# Patient Record
Sex: MALE | Race: WHITE | NOT HISPANIC OR LATINO | Employment: FULL TIME | URBAN - NONMETROPOLITAN AREA
[De-identification: names, ages, dates, MRNs, and addresses within clinical notes are randomized per-mention and may not be internally consistent; named-entity substitution may affect disease eponyms.]

---

## 2022-10-06 ENCOUNTER — OFFICE VISIT (OUTPATIENT)
Dept: MEDICAL GROUP | Facility: PHYSICIAN GROUP | Age: 22
End: 2022-10-06
Payer: COMMERCIAL

## 2022-10-06 VITALS
WEIGHT: 315 LBS | SYSTOLIC BLOOD PRESSURE: 112 MMHG | RESPIRATION RATE: 18 BRPM | HEIGHT: 75 IN | DIASTOLIC BLOOD PRESSURE: 74 MMHG | TEMPERATURE: 98.2 F | HEART RATE: 68 BPM | OXYGEN SATURATION: 97 % | BODY MASS INDEX: 39.17 KG/M2

## 2022-10-06 DIAGNOSIS — Z82.49 FAMILY HISTORY OF HEART DISEASE: ICD-10-CM

## 2022-10-06 DIAGNOSIS — R06.83 LOUD SNORING: ICD-10-CM

## 2022-10-06 DIAGNOSIS — R00.2 PALPITATIONS: ICD-10-CM

## 2022-10-06 DIAGNOSIS — E66.01 MORBID OBESITY WITH BMI OF 40.0-44.9, ADULT (HCC): ICD-10-CM

## 2022-10-06 PROCEDURE — 99203 OFFICE O/P NEW LOW 30 MIN: CPT | Performed by: STUDENT IN AN ORGANIZED HEALTH CARE EDUCATION/TRAINING PROGRAM

## 2022-10-06 ASSESSMENT — ENCOUNTER SYMPTOMS
FEVER: 0
WHEEZING: 0
SHORTNESS OF BREATH: 0
ORTHOPNEA: 0
WEIGHT LOSS: 0
PALPITATIONS: 1
HEADACHES: 0
COUGH: 0
DIZZINESS: 0

## 2022-10-06 ASSESSMENT — PATIENT HEALTH QUESTIONNAIRE - PHQ9: CLINICAL INTERPRETATION OF PHQ2 SCORE: 0

## 2022-10-06 NOTE — ASSESSMENT & PLAN NOTE
Patient have intermittent palpitations with chest pain, went to the ER by that time it had resolved and ekg was wnl.     Thyroid testing  Referral to cardiology for cardiac monitoring may need holter monitor.

## 2022-10-06 NOTE — PROGRESS NOTES
Subjective:   HISTORY OF THE PRESENT ILLNESS: Patient is a 22 y.o. male here today to establish care.     Problem   Morbid Obesity With Bmi of 40.0-44.9, Adult (Hcc)   Palpitations    Intermittent palpitations, when he stays hydrated palpitations go away.     Does get dizziness occasionally as well chest pain that is not exertional. In fact feels like exertion makes the pain go away. Gets the chest pain 1 time a week associated with palpitations and dizziness.     No shortness of breath     Family History of Heart Disease    Mother had MI at age 24 and then again in 40s. Patient reports.           Current Outpatient Medications Ordered in Epic   Medication Sig Dispense Refill    ibuprofen (MOTRIN) 100 MG/5ML SUSP Take  by mouth every 6 hours as needed.       No current Epic-ordered facility-administered medications on file.       Review of systems:  Review of Systems   Constitutional:  Negative for fever, malaise/fatigue and weight loss.   Respiratory:  Negative for cough, shortness of breath and wheezing.    Cardiovascular:  Positive for chest pain and palpitations. Negative for orthopnea and leg swelling.   Neurological:  Negative for dizziness and headaches.       History reviewed. No pertinent past medical history.  Past Surgical History:   Procedure Laterality Date    ORIF, FRACTURE, RADIUS AND ULNA  2013    Performed by Michelet Mendoza D.O. at SURGERY [facility]     Social History     Tobacco Use    Smoking status: Former     Packs/day: 1.00     Years: 5.00     Pack years: 5.00     Types: Cigarettes     Quit date:      Years since quittin.7    Smokeless tobacco: Never   Vaping Use    Vaping Use: Every day    Substances: Nicotine (6 mg)    Devices: Ref[employer]ovalysble tank   Substance Use Topics    Alcohol use: Yes     Comment: rarely    Drug use: No      Family History   Problem Relation Age of Onset    Heart Disease Mother         MI at 41    Thyroid Mother     Thyroid Father      Current  "Outpatient Medications   Medication Sig Dispense Refill    ibuprofen (MOTRIN) 100 MG/5ML SUSP Take  by mouth every 6 hours as needed.       No current facility-administered medications for this visit.       Allergies:  No Known Allergies    Allergies, past medical history, past surgical history, family history, social history reviewed and updated.    Objective:    /74 (BP Location: Left arm, Patient Position: Sitting, BP Cuff Size: Adult long)   Pulse 68   Temp 36.8 °C (98.2 °F) (Temporal)   Resp 18   Ht 1.905 m (6' 3\")   Wt (!) 147 kg (324 lb 8.3 oz)   SpO2 97%   BMI 40.56 kg/m²    Body mass index is 40.56 kg/m².    Physical exam:  General: Normal appearance, no acute distress, not ill-appearing  HEENT: EOM intact, conjunctiva normal limits, negative right/left eye discharge.  Sclera anicteric  Cardiovascular: Normal rate and rhythm, no murmurs  Pulmonary: No respiratory distress, no wheezing, no rales, breath sounds normal.  Abdomen: Bowel sounds normal, flat, soft.  Musculoskeletal: No edema bilaterally  Skin: Warm, dry, no lesions  Neurological: No focal deficits, normal gait  Psychiatric: Mood within normal limits    Assessment/Plan:    Patient here for a preventive medicine visit today and to establish care.  -Reviewed all past medical history, family history, social history    -Diet and exercise appropriate counseling given  -Social determinants of health reviewed  -Tobacco, alcohol, recreational drug use: Discussed vaping use counseling given.  -Occupation:   -Cholesterol screening: Ordered  -Diabetes screening: Ordered    Immunizations/Infectious disease:  STI screening:declines  Safe sex practices discusssed  HIV screening: declines  Immunizations: declines flue    Cancer screenings:  Colorectal cancer screening: no fam hx of colon cancer      Problem List Items Addressed This Visit       Morbid obesity with BMI of 40.0-44.9, adult (HCC)     Reports have been trying and lost 40lbs in " last 3-4 months.          Relevant Orders    Patient identified as having weight management issue.  Appropriate orders and counseling given.    Lipid Profile    HEMOGLOBIN A1C    TSH    FREE THYROXINE    Comp Metabolic Panel    CBC WITHOUT DIFFERENTIAL    Palpitations     Patient have intermittent palpitations with chest pain, went to the ER by that time it had resolved and ekg was wnl.     Thyroid testing  Referral to cardiology for cardiac monitoring may need holter monitor.          Relevant Orders    REFERRAL TO CARDIOLOGY    Family history of heart disease     Other Visit Diagnoses       Loud snoring        Relevant Orders    Referral to Pulmonary and Sleep Medicine             Return in about 4 weeks (around 11/3/2022) for symptoms, labs.

## 2022-10-06 NOTE — LETTER
October 6, 2022    To Whom It May Concern:         This is confirmation that Anurag Workman attended his scheduled appointment with Charleen Gaxiola D.O. on 10/06/22.         If you have any questions please do not hesitate to call me at the phone number listed below.    Sincerely,          Charleen Gaxiola D.O.  [phone]

## 2022-11-10 ENCOUNTER — OFFICE VISIT (OUTPATIENT)
Dept: MEDICAL GROUP | Facility: PHYSICIAN GROUP | Age: 22
End: 2022-11-10
Payer: COMMERCIAL

## 2022-11-10 VITALS
OXYGEN SATURATION: 94 % | HEIGHT: 75 IN | RESPIRATION RATE: 18 BRPM | TEMPERATURE: 97.9 F | WEIGHT: 315 LBS | BODY MASS INDEX: 39.17 KG/M2 | DIASTOLIC BLOOD PRESSURE: 78 MMHG | SYSTOLIC BLOOD PRESSURE: 110 MMHG | HEART RATE: 72 BPM

## 2022-11-10 DIAGNOSIS — L30.0 NUMMULAR ECZEMATOUS DERMATITIS: ICD-10-CM

## 2022-11-10 DIAGNOSIS — R07.9 CHEST PAIN AT REST: ICD-10-CM

## 2022-11-10 DIAGNOSIS — R00.2 PALPITATIONS: ICD-10-CM

## 2022-11-10 DIAGNOSIS — R13.19 ESOPHAGEAL DYSPHAGIA: ICD-10-CM

## 2022-11-10 DIAGNOSIS — R22.1 FULLNESS OF NECK: ICD-10-CM

## 2022-11-10 DIAGNOSIS — Z82.49 FAMILY HISTORY OF HEART DISEASE: ICD-10-CM

## 2022-11-10 PROCEDURE — 99214 OFFICE O/P EST MOD 30 MIN: CPT | Performed by: STUDENT IN AN ORGANIZED HEALTH CARE EDUCATION/TRAINING PROGRAM

## 2022-11-10 NOTE — ASSESSMENT & PLAN NOTE
I suspect these are nummular eczema lesions.  He has tried clotrimazole for over a week with no improvement.  I will prescribe him a medium potency topical steroid to use for 14 days and advised him to use an emollient such as CeraVe a to keep moisturized.  We will see patient back in 1 month

## 2022-11-10 NOTE — PROGRESS NOTES
HISTORY OF PRESENT ILLNESS: Anurag is a pleasant 22 y.o. male, established patient who presents today to discuss medical problems as listed below:    Problem   Chest Pain At Rest   Nummular Eczematous Dermatitis    About 1 month ago patient started developing circular lesions on both of his arms.  He has never had this before.  He suspected it was ringworm though he denies any close contacts with other people.  He picked up clotrimazole cream and placed it on the lesions twice a day for over a week with no improvement.  They are scaly, dry and itchy.  He does have a history of eczema but not circular as these are.     Fullness of Neck    Patient reports that he feels a fullness in his neck that occasionally causes him some trouble swallowing.  Sensation occurs at random with no noticeable instigating factors though he does notice it more when he is tired after work.  Denies any nausea, vomiting, trouble breathing.  Denies any history of sleep apnea, reports that he does not snore     Esophageal Dysphagia        Current Outpatient Medications Ordered in Epic   Medication Sig Dispense Refill    betamethasone valerate (VALISONE) 0.1 % Cream Apply 1 Application topically 2 times a day for 14 days. 45 g 0    ibuprofen (MOTRIN) 100 MG/5ML SUSP Take  by mouth every 6 hours as needed.       No current Epic-ordered facility-administered medications on file.       Review of systems:  Per HPI    No past medical history on file.  Past Surgical History:   Procedure Laterality Date    ORIF, FRACTURE, RADIUS AND ULNA  2013    Performed by Michelet Mendoza D.O. at SURGERY [facility]     Social History     Tobacco Use    Smoking status: Former     Packs/day: 1.00     Years: 5.00     Pack years: 5.00     Types: Cigarettes     Quit date:      Years since quittin.8    Smokeless tobacco: Never   Vaping Use    Vaping Use: Every day    Substances: Nicotine (6 mg)    Devices: [employer]   Substance Use Topics     "Alcohol use: Yes     Comment: rarely    Drug use: No      Family History   Problem Relation Age of Onset    Heart Disease Mother         MI at 41    Thyroid Mother     Thyroid Father      Current Outpatient Medications   Medication Sig Dispense Refill    betamethasone valerate (VALISONE) 0.1 % Cream Apply 1 Application topically 2 times a day for 14 days. 45 g 0    ibuprofen (MOTRIN) 100 MG/5ML SUSP Take  by mouth every 6 hours as needed.       No current facility-administered medications for this visit.       Allergies:  No Known Allergies    Allergies, past medical history, past surgical history, family history, social history reviewed and updated.    Objective:    /78 (BP Location: Left arm, Patient Position: Sitting, BP Cuff Size: Adult long)   Pulse 72   Temp 36.6 °C (97.9 °F) (Temporal)   Resp 18   Ht 1.905 m (6' 3\")   Wt (!) 143 kg (315 lb 14.7 oz)   SpO2 94%   BMI 39.49 kg/m²    Body mass index is 39.49 kg/m².    Physical exam:  General: Normal appearance, no acute distress, not ill-appearing  HEENT: EOM intact, conjunctiva normal limits, negative right/left eye discharge.  Sclera anicteric  Cardiovascular: Normal rate and rhythm, no murmurs  Pulmonary: No respiratory distress, no wheezing, no rales, breath sounds normal.  Abdomen: Bowel sounds normal, flat, soft.  Musculoskeletal: No edema bilaterally  Skin: Warm, dry, no lesions  Neurological: No focal deficits, normal gait  Psychiatric: Mood within normal limits    Assessment/Plan:    Problem List Items Addressed This Visit       Palpitations    Relevant Orders    REFERRAL TO CARDIOLOGY    Family history of heart disease    Relevant Orders    REFERRAL TO CARDIOLOGY    Chest pain at rest    Relevant Orders    REFERRAL TO CARDIOLOGY    Nummular eczematous dermatitis     I suspect these are nummular eczema lesions.  He has tried clotrimazole for over a week with no improvement.  I will prescribe him a medium potency topical steroid to use for 14 " days and advised him to use an emollient such as CeraVe a to keep moisturized.  We will see patient back in 1 month         Relevant Medications    betamethasone valerate (VALISONE) 0.1 % Cream    Fullness of neck     Ultrasound of the neck  Consider sleep study suspicion for sleep apnea is high due to body habitus.         Relevant Orders    US-SOFT TISSUES OF HEAD - NECK    Esophageal dysphagia    Relevant Orders    US-SOFT TISSUES OF HEAD - NECK        Return in about 4 weeks (around 12/8/2022) for labs.

## 2022-11-10 NOTE — ASSESSMENT & PLAN NOTE
Ultrasound of the neck  Consider sleep study suspicion for sleep apnea is high due to body habitus.

## 2022-12-09 ENCOUNTER — OFFICE VISIT (OUTPATIENT)
Dept: MEDICAL GROUP | Facility: PHYSICIAN GROUP | Age: 22
End: 2022-12-09
Payer: COMMERCIAL

## 2022-12-09 VITALS
WEIGHT: 313.94 LBS | TEMPERATURE: 97.4 F | BODY MASS INDEX: 39.03 KG/M2 | HEIGHT: 75 IN | OXYGEN SATURATION: 98 % | DIASTOLIC BLOOD PRESSURE: 80 MMHG | RESPIRATION RATE: 16 BRPM | SYSTOLIC BLOOD PRESSURE: 118 MMHG | HEART RATE: 74 BPM

## 2022-12-09 DIAGNOSIS — E66.01 MORBID OBESITY WITH BMI OF 40.0-44.9, ADULT (HCC): ICD-10-CM

## 2022-12-09 DIAGNOSIS — R07.9 CHEST PAIN AT REST: ICD-10-CM

## 2022-12-09 DIAGNOSIS — L30.0 NUMMULAR ECZEMATOUS DERMATITIS: ICD-10-CM

## 2022-12-09 PROCEDURE — 99213 OFFICE O/P EST LOW 20 MIN: CPT | Performed by: STUDENT IN AN ORGANIZED HEALTH CARE EDUCATION/TRAINING PROGRAM

## 2022-12-09 RX ORDER — TRIAMCINOLONE ACETONIDE 1 MG/G
1 OINTMENT TOPICAL 2 TIMES DAILY
Qty: 80 G | Refills: 0 | Status: SHIPPED | OUTPATIENT
Start: 2022-12-09 | End: 2023-01-08

## 2022-12-09 NOTE — ASSESSMENT & PLAN NOTE
Rx high potency triamcinolone ointment to use twice daily for 30 days.  Return to care in 1 month for reevaluation

## 2022-12-09 NOTE — PROGRESS NOTES
HISTORY OF PRESENT ILLNESS: Anurag is a pleasant 22 y.o. male, established patient who presents today to discuss medical problems as listed below:    Problem   Chest Pain At Rest   Nummular Eczematous Dermatitis    About 1 month ago patient started developing circular lesions on both of his arms.  He has never had this before.  He suspected it was ringworm though he denies any close contacts with other people.  He picked up clotrimazole cream and placed it on the lesions twice a day for over a week with no improvement.  At the last visit he was prescribed a steroid ointment but had not picked it up.  The rash has since grown worse on his arms and is spreading.          Current Outpatient Medications Ordered in Epic   Medication Sig Dispense Refill    triamcinolone acetonide (KENALOG) 0.1 % Ointment Apply 1 Application topically 2 times a day for 30 days. 80 g 0    ibuprofen (MOTRIN) 100 MG/5ML SUSP Take  by mouth every 6 hours as needed.       No current Epic-ordered facility-administered medications on file.       Review of systems:  Per HPI    No past medical history on file.  Past Surgical History:   Procedure Laterality Date    ORIF, FRACTURE, RADIUS AND ULNA  2013    Performed by Michelet Mendoza D.O. at SURGERY [facility]     Social History     Tobacco Use    Smoking status: Former     Packs/day: 1.00     Years: 5.00     Pack years: 5.00     Types: Cigarettes     Quit date:      Years since quittin.9    Smokeless tobacco: Never   Vaping Use    Vaping Use: Every day    Substances: Nicotine (6 mg)    Devices: Ref[employer]ograph[employer]ble tank   Substance Use Topics    Alcohol use: Yes     Comment: rarely    Drug use: No      Family History   Problem Relation Age of Onset    Heart Disease Mother         MI at 41    Thyroid Mother     Thyroid Father      Current Outpatient Medications   Medication Sig Dispense Refill    triamcinolone acetonide (KENALOG) 0.1 % Ointment Apply 1 Application topically 2 times a  "day for 30 days. 80 g 0    ibuprofen (MOTRIN) 100 MG/5ML SUSP Take  by mouth every 6 hours as needed.       No current facility-administered medications for this visit.       Allergies:  No Known Allergies    Allergies, past medical history, past surgical history, family history, social history reviewed and updated.    Objective:    /80 (BP Location: Left arm, Patient Position: Sitting, BP Cuff Size: Adult long)   Pulse 74   Temp 36.3 °C (97.4 °F) (Temporal)   Resp 16   Ht 1.905 m (6' 3\")   Wt (!) 142 kg (313 lb 15 oz)   SpO2 98%   BMI 39.24 kg/m²    Body mass index is 39.24 kg/m².    Physical exam:  General: Normal appearance, no acute distress, not ill-appearing  HEENT: EOM intact, conjunctiva normal limits, negative right/left eye discharge.  Sclera anicteric  Cardiovascular: Normal rate and rhythm, no murmurs  Pulmonary: No respiratory distress, no wheezing, no rales, breath sounds normal.  Abdomen: Bowel sounds normal, flat, soft.  Musculoskeletal: No edema bilaterally  Skin: Warm, dry, no lesions  Neurological: No focal deficits, normal gait  Psychiatric: Mood within normal limits    Assessment/Plan:    Problem List Items Addressed This Visit       Morbid obesity with BMI of 40.0-44.9, adult (HCC)    Relevant Orders    Lipid Profile    HEMOGLOBIN A1C    Chest pain at rest     The ASCVD Risk score ([place] DK, et al., 2019) failed to calculate for the following reasons:    The 2019 ASCVD risk score is only valid for ages 40 to 79    Patient continues to have chest pain at rest, palpitations occasionally as well as dyspnea on exertion.  He was referred to cardiology at the last visit but never received a call back.  We will reprint referral for him.         Nummular eczematous dermatitis     Rx high potency triamcinolone ointment to use twice daily for 30 days.  Return to care in 1 month for reevaluation         Relevant Medications    triamcinolone acetonide (KENALOG) 0.1 % Ointment        Return in " about 4 weeks (around 1/6/2023) for symptoms.

## 2022-12-09 NOTE — ASSESSMENT & PLAN NOTE
The ASCVD Risk score (Joo COSTELLO, et al., 2019) failed to calculate for the following reasons:    The 2019 ASCVD risk score is only valid for ages 40 to 79    Patient continues to have chest pain at rest, palpitations occasionally as well as dyspnea on exertion.  He was referred to cardiology at the last visit but never received a call back.  We will reprint referral for him.

## 2022-12-15 LAB — HBA1C MFR BLD: 5.5 % (ref 0–5.6)

## 2022-12-19 ENCOUNTER — TELEPHONE (OUTPATIENT)
Dept: MEDICAL GROUP | Facility: PHYSICIAN GROUP | Age: 22
End: 2022-12-19
Payer: COMMERCIAL

## 2022-12-19 NOTE — TELEPHONE ENCOUNTER
----- Message from Charleen Gaxiola D.O. sent at 12/19/2022  7:22 AM PST -----  Please let patient know his labs look good     Charleen Gaxiola D.O.

## 2022-12-19 NOTE — TELEPHONE ENCOUNTER
Spoke to patient in regards to his lab results. Informed him that they were normal. No other questions at this time.

## 2023-01-13 ENCOUNTER — OFFICE VISIT (OUTPATIENT)
Dept: MEDICAL GROUP | Facility: PHYSICIAN GROUP | Age: 23
End: 2023-01-13
Payer: COMMERCIAL

## 2023-01-13 VITALS
SYSTOLIC BLOOD PRESSURE: 110 MMHG | HEART RATE: 78 BPM | RESPIRATION RATE: 16 BRPM | WEIGHT: 305 LBS | OXYGEN SATURATION: 100 % | DIASTOLIC BLOOD PRESSURE: 72 MMHG | HEIGHT: 74 IN | TEMPERATURE: 98.1 F | BODY MASS INDEX: 39.14 KG/M2

## 2023-01-13 DIAGNOSIS — R07.9 CHEST PAIN AT REST: ICD-10-CM

## 2023-01-13 DIAGNOSIS — M54.31 RIGHT SIDED SCIATICA: ICD-10-CM

## 2023-01-13 DIAGNOSIS — K21.9 GASTROESOPHAGEAL REFLUX DISEASE, UNSPECIFIED WHETHER ESOPHAGITIS PRESENT: ICD-10-CM

## 2023-01-13 PROCEDURE — 93000 ELECTROCARDIOGRAM COMPLETE: CPT | Performed by: STUDENT IN AN ORGANIZED HEALTH CARE EDUCATION/TRAINING PROGRAM

## 2023-01-13 PROCEDURE — 99214 OFFICE O/P EST MOD 30 MIN: CPT | Performed by: STUDENT IN AN ORGANIZED HEALTH CARE EDUCATION/TRAINING PROGRAM

## 2023-01-13 RX ORDER — FAMOTIDINE 20 MG/1
40 TABLET, FILM COATED ORAL 2 TIMES DAILY
Qty: 60 TABLET | Refills: 1 | Status: SHIPPED | OUTPATIENT
Start: 2023-01-13 | End: 2023-04-14

## 2023-01-13 RX ORDER — CYCLOBENZAPRINE HCL 10 MG
10 TABLET ORAL 2 TIMES DAILY PRN
Qty: 60 TABLET | Refills: 1 | Status: SHIPPED | OUTPATIENT
Start: 2023-01-13

## 2023-01-13 RX ORDER — OMEPRAZOLE 10 MG/1
30 CAPSULE, DELAYED RELEASE ORAL DAILY
COMMUNITY
End: 2023-04-14

## 2023-01-13 ASSESSMENT — FIBROSIS 4 INDEX: FIB4 SCORE: 0.4

## 2023-01-13 ASSESSMENT — PATIENT HEALTH QUESTIONNAIRE - PHQ9: CLINICAL INTERPRETATION OF PHQ2 SCORE: 0

## 2023-01-13 NOTE — PROGRESS NOTES
HISTORY OF PRESENT ILLNESS: Anurag is a pleasant 22 y.o. male, established patient who presents today to discuss medical problems as listed below:    Problem   Chest Pain At Rest    Patient still reports to chest pain that occurs at rest.  Denies any exertional component to this.  He went to the ER a few weeks ago and was told that he had a hiatal hernia that may be the cause of the symptoms.  He was given a PPI which does help the symptoms somewhat but they still persist.  Troponin negative.          Current Outpatient Medications Ordered in Epic   Medication Sig Dispense Refill    omeprazole (PRILOSEC) 10 MG CAPSULE DELAYED RELEASE Take 30 mg by mouth every day.      famotidine (PEPCID) 20 MG Tab Take 2 Tablets by mouth 2 times a day. 60 Tablet 1    cyclobenzaprine (FLEXERIL) 10 mg Tab Take 1 Tablet by mouth 2 times a day as needed for Moderate Pain. 60 Tablet 1    ibuprofen (MOTRIN) 100 MG/5ML SUSP Take  by mouth every 6 hours as needed. (Patient not taking: Reported on 2023)       No current [facility]-ordered facility-administered medications on file.       Review of systems:  Per HPI    No past medical history on file.  Past Surgical History:   Procedure Laterality Date    ORIF, FRACTURE, RADIUS AND ULNA  2013    Performed by Michelet Mendoza D.O. at SURGERY [facility]     Social History     Tobacco Use    Smoking status: Former     Packs/day: 1.00     Years: 5.00     Pack years: 5.00     Types: Cigarettes     Quit date:      Years since quittin.0    Smokeless tobacco: Never   Vaping Use    Vaping Use: Every day    Substances: Nicotine (6 mg)    Devices: [employer] tank   Substance Use Topics    Alcohol use: Yes     Comment: rarely    Drug use: No      Family History   Problem Relation Age of Onset    Heart Disease Mother         MI at 41    Thyroid Mother     Thyroid Father      Current Outpatient Medications   Medication Sig Dispense Refill    omeprazole (PRILOSEC) 10 MG CAPSULE DELAYED  "RELEASE Take 30 mg by mouth every day.      famotidine (PEPCID) 20 MG Tab Take 2 Tablets by mouth 2 times a day. 60 Tablet 1    cyclobenzaprine (FLEXERIL) 10 mg Tab Take 1 Tablet by mouth 2 times a day as needed for Moderate Pain. 60 Tablet 1    ibuprofen (MOTRIN) 100 MG/5ML SUSP Take  by mouth every 6 hours as needed. (Patient not taking: Reported on 1/13/2023)       No current facility-administered medications for this visit.       Allergies:  No Known Allergies    Allergies, past medical history, past surgical history, family history, social history reviewed and updated.    Objective:    /72 (BP Location: Left arm, Patient Position: Sitting, BP Cuff Size: Large adult)   Pulse 78   Temp 36.7 °C (98.1 °F) (Temporal)   Resp 16   Ht 1.88 m (6' 2\")   Wt (!) 138 kg (305 lb)   SpO2 100%   BMI 39.16 kg/m²    Body mass index is 39.16 kg/m².    Physical exam:  General: Normal appearance, no acute distress, not ill-appearing  HEENT: EOM intact, conjunctiva normal limits, negative right/left eye discharge.  Sclera anicteric  Cardiovascular: Normal rate and rhythm, no murmurs  Pulmonary: No respiratory distress, no wheezing, no rales, breath sounds normal.  Abdomen: Bowel sounds normal, flat, soft.  Musculoskeletal: No edema bilaterally  Skin: Warm, dry, no lesions  Neurological: No focal deficits, normal gait  Psychiatric: Mood within normal limits    Assessment/Plan:    Problem List Items Addressed This Visit       Chest pain at rest     We will work patient up for possible reflux, esophagitis.  Do not believe this is cardiac etiology but will keep on differential.  Chest pain is nonexertional    EKG: Regular sinus rate and rhythm.  Normal axis, none ST segment changes, no T wave inversions, intervals within normal limits    Discontinue PPI, start Pepcid.  After 1 week get the H. pylori breath test.  We will follow-up after results.  Consider referring to GI for hiatal hernia management.    This chronic issue " not well controlled medication management and test ordered  ER documentation review including CT scan of chest, labs..,  Chest x-ray.           Relevant Orders    EKG - Clinic Performed (Completed)     Other Visit Diagnoses       Gastroesophageal reflux disease, unspecified whether esophagitis present        Relevant Medications    omeprazole (PRILOSEC) 10 MG CAPSULE DELAYED RELEASE    famotidine (PEPCID) 20 MG Tab    Other Relevant Orders    H. PYLORI, UREA BREATH TEST, ADULT    Right sided sciatica        Relevant Medications    cyclobenzaprine (FLEXERIL) 10 mg Tab             Return in about 3 months (around 4/13/2023) for symptoms.

## 2023-01-14 NOTE — ASSESSMENT & PLAN NOTE
We will work patient up for possible reflux, esophagitis.  Do not believe this is cardiac etiology but will keep on differential.  Chest pain is nonexertional    EKG: Regular sinus rate and rhythm.  Normal axis, none ST segment changes, no T wave inversions, intervals within normal limits    Discontinue PPI, start Pepcid.  After 1 week get the H. pylori breath test.  We will follow-up after results.  Consider referring to GI for hiatal hernia management.    This chronic issue not well controlled medication management and test ordered  ER documentation review including CT scan of chest, labs..,  Chest x-ray.

## 2023-04-12 RX ORDER — LANSOPRAZOLE 30 MG/1
30 CAPSULE, DELAYED RELEASE ORAL
COMMUNITY
Start: 2022-12-17 | End: 2023-04-14

## 2023-04-14 ENCOUNTER — OFFICE VISIT (OUTPATIENT)
Dept: MEDICAL GROUP | Facility: PHYSICIAN GROUP | Age: 23
End: 2023-04-14
Payer: COMMERCIAL

## 2023-04-14 VITALS
OXYGEN SATURATION: 95 % | TEMPERATURE: 98.9 F | HEART RATE: 85 BPM | DIASTOLIC BLOOD PRESSURE: 76 MMHG | SYSTOLIC BLOOD PRESSURE: 112 MMHG | BODY MASS INDEX: 39.47 KG/M2 | HEIGHT: 74 IN | WEIGHT: 307.54 LBS | RESPIRATION RATE: 16 BRPM

## 2023-04-14 DIAGNOSIS — L30.0 NUMMULAR ECZEMATOUS DERMATITIS: ICD-10-CM

## 2023-04-14 DIAGNOSIS — R06.09 DOE (DYSPNEA ON EXERTION): ICD-10-CM

## 2023-04-14 DIAGNOSIS — R06.83 LOUD SNORING: ICD-10-CM

## 2023-04-14 PROBLEM — R00.2 PALPITATIONS: Status: RESOLVED | Noted: 2022-10-06 | Resolved: 2023-04-14

## 2023-04-14 PROBLEM — R22.1 FULLNESS OF NECK: Status: RESOLVED | Noted: 2022-11-10 | Resolved: 2023-04-14

## 2023-04-14 PROBLEM — E66.9 OBESITY (BMI 30-39.9): Status: ACTIVE | Noted: 2022-10-06

## 2023-04-14 PROBLEM — R13.19 ESOPHAGEAL DYSPHAGIA: Status: RESOLVED | Noted: 2022-11-10 | Resolved: 2023-04-14

## 2023-04-14 PROCEDURE — 99214 OFFICE O/P EST MOD 30 MIN: CPT | Performed by: STUDENT IN AN ORGANIZED HEALTH CARE EDUCATION/TRAINING PROGRAM

## 2023-04-14 ASSESSMENT — ENCOUNTER SYMPTOMS
DIZZINESS: 0
HEADACHES: 0
COUGH: 0
SHORTNESS OF BREATH: 1
WHEEZING: 0
PALPITATIONS: 0

## 2023-04-14 ASSESSMENT — FIBROSIS 4 INDEX: FIB4 SCORE: 0.42

## 2023-04-14 NOTE — ASSESSMENT & PLAN NOTE
Kenalog ointment for 3 weeks  Then start hydrocortisone 2.5% as needed for maintenance    Advised daily CeraVe ointment after showering to keep skin moisturized and hydrated    Chronic condition, not well controlled medication management

## 2023-04-14 NOTE — ASSESSMENT & PLAN NOTE
Dyspnea on exertion which occurs in the morning  Likely a factor of sleep apnea, vaping and deconditioning.  No wheezing on exam today    Plan:  Continue weight loss, continue vaping cessation which she has started 2 weeks ago.  Sleep study for sleep apnea.  If worsening return to care, consider pulmonary function test at next visit if not improving

## 2023-04-14 NOTE — PROGRESS NOTES
HISTORY OF PRESENT ILLNESS: Anurag is a pleasant 23 y.o. male, established patient who presents today to discuss medical problems as listed below:    #Nummular eczema  Patient reports that he uses a high potency Kenalog ointment and it works well however after he discontinues this the rash comes back.  He has not been using any CeraVe emollient or moisturizer.  And also does not have a hydrocortisone low potency ointment to use as needed.    #Obesity  Improving BMI now 39 from 40.  He is continuing to make his own meals at home, exercising and watching his portions.       Current Outpatient Medications Ordered in Epic   Medication Sig Dispense Refill    hydrocortisone 2.5 % Cream topical cream Apply 1 Application. topically 2 times a day. 453 g 1    cyclobenzaprine (FLEXERIL) 10 mg Tab Take 1 Tablet by mouth 2 times a day as needed for Moderate Pain. 60 Tablet 1     No current Epic-ordered facility-administered medications on file.       Review of systems:  Review of Systems   Respiratory:  Positive for shortness of breath. Negative for cough and wheezing.    Cardiovascular:  Negative for chest pain and palpitations.   Neurological:  Negative for dizziness and headaches.       History reviewed. No pertinent past medical history.  Past Surgical History:   Procedure Laterality Date    ORIF, FRACTURE, RADIUS AND ULNA  2013    Performed by Michelet Mendoza D.O. at SURGERY [facility]     Social History     Tobacco Use    Smoking status: Former     Packs/day: 1.00     Years: 5.00     Pack years: 5.00     Types: Cigarettes     Quit date:      Years since quittin.2    Smokeless tobacco: Never   Vaping Use    Vaping Use: Every day    Substances: Nicotine (6 mg)    Devices: [employer]   Substance Use Topics    Alcohol use: Yes     Comment: rarely    Drug use: No      Family History   Problem Relation Age of Onset    Heart Disease Mother         MI at 41    Thyroid Mother     Thyroid Father   "    Current Outpatient Medications   Medication Sig Dispense Refill    hydrocortisone 2.5 % Cream topical cream Apply 1 Application. topically 2 times a day. 453 g 1    cyclobenzaprine (FLEXERIL) 10 mg Tab Take 1 Tablet by mouth 2 times a day as needed for Moderate Pain. 60 Tablet 1     No current facility-administered medications for this visit.       Allergies:  No Known Allergies    Allergies, past medical history, past surgical history, family history, social history reviewed and updated.    Objective:    /76 (BP Location: Left arm, Patient Position: Sitting, BP Cuff Size: Adult long)   Pulse 85   Temp 37.2 °C (98.9 °F) (Temporal)   Resp 16   Ht 1.88 m (6' 2\")   Wt (!) 140 kg (307 lb 8.7 oz)   SpO2 95%   BMI 39.49 kg/m²    Body mass index is 39.49 kg/m².    Physical exam:  General: Normal appearance, no acute distress, not ill-appearing  HEENT: EOM intact, conjunctiva normal limits, negative right/left eye discharge.  Sclera anicteric  Cardiovascular: Normal rate and rhythm, no murmurs  Pulmonary: No respiratory distress, no wheezing, no rales, breath sounds normal.  Abdomen: Bowel sounds normal, flat, soft.  Musculoskeletal: No edema bilaterally  Skin: Warm, dry, no lesions  Neurological: No focal deficits, normal gait  Psychiatric: Mood within normal limits    Assessment/Plan:    Problem List Items Addressed This Visit       Nummular eczematous dermatitis     Kenalog ointment for 3 weeks  Then start hydrocortisone 2.5% as needed for maintenance    Advised daily CeraVe ointment after showering to keep skin moisturized and hydrated    Chronic condition, not well controlled medication management         Relevant Medications    hydrocortisone 2.5 % Cream topical cream    HURD (dyspnea on exertion)     Dyspnea on exertion which occurs in the morning  Likely a factor of sleep apnea, vaping and deconditioning.  No wheezing on exam today    Plan:  Continue weight loss, continue vaping cessation which she " has started 2 weeks ago.  Sleep study for sleep apnea.  If worsening return to care, consider pulmonary function test at next visit if not improving          Other Visit Diagnoses       Loud snoring        Relevant Orders    Referral to Pulmonary and Sleep Medicine             Return in about 6 months (around 10/14/2023), or if symptoms worsen or fail to improve.

## 2023-10-13 ENCOUNTER — OFFICE VISIT (OUTPATIENT)
Dept: MEDICAL GROUP | Facility: PHYSICIAN GROUP | Age: 23
End: 2023-10-13
Payer: COMMERCIAL

## 2023-10-13 VITALS
SYSTOLIC BLOOD PRESSURE: 122 MMHG | TEMPERATURE: 97.7 F | RESPIRATION RATE: 16 BRPM | BODY MASS INDEX: 39.17 KG/M2 | WEIGHT: 315 LBS | OXYGEN SATURATION: 94 % | HEIGHT: 75 IN | HEART RATE: 83 BPM | DIASTOLIC BLOOD PRESSURE: 78 MMHG

## 2023-10-13 DIAGNOSIS — E66.01 CLASS 3 SEVERE OBESITY DUE TO EXCESS CALORIES WITH SERIOUS COMORBIDITY AND BODY MASS INDEX (BMI) OF 40.0 TO 44.9 IN ADULT (HCC): ICD-10-CM

## 2023-10-13 DIAGNOSIS — L30.0 NUMMULAR ECZEMATOUS DERMATITIS: ICD-10-CM

## 2023-10-13 DIAGNOSIS — E66.01 MORBID OBESITY WITH BMI OF 40.0-44.9, ADULT (HCC): ICD-10-CM

## 2023-10-13 DIAGNOSIS — J30.1 SEASONAL ALLERGIC RHINITIS DUE TO POLLEN: ICD-10-CM

## 2023-10-13 DIAGNOSIS — H61.23 BILATERAL IMPACTED CERUMEN: ICD-10-CM

## 2023-10-13 PROBLEM — R06.02 SHORTNESS OF BREATH: Status: ACTIVE | Noted: 2023-04-14

## 2023-10-13 PROBLEM — E66.09 OBESITY DUE TO EXCESS CALORIES WITH SERIOUS COMORBIDITY: Status: ACTIVE | Noted: 2023-10-13

## 2023-10-13 PROBLEM — R07.9 CHEST PAIN AT REST: Status: RESOLVED | Noted: 2022-11-10 | Resolved: 2023-10-13

## 2023-10-13 PROCEDURE — 99213 OFFICE O/P EST LOW 20 MIN: CPT | Performed by: STUDENT IN AN ORGANIZED HEALTH CARE EDUCATION/TRAINING PROGRAM

## 2023-10-13 PROCEDURE — 3078F DIAST BP <80 MM HG: CPT | Performed by: STUDENT IN AN ORGANIZED HEALTH CARE EDUCATION/TRAINING PROGRAM

## 2023-10-13 PROCEDURE — 3074F SYST BP LT 130 MM HG: CPT | Performed by: STUDENT IN AN ORGANIZED HEALTH CARE EDUCATION/TRAINING PROGRAM

## 2023-10-13 RX ORDER — CETIRIZINE HYDROCHLORIDE 10 MG/1
10 TABLET ORAL DAILY
Qty: 90 TABLET | Refills: 1 | Status: SHIPPED | OUTPATIENT
Start: 2023-10-13

## 2023-10-13 RX ORDER — FLUTICASONE PROPIONATE 50 MCG
1 SPRAY, SUSPENSION (ML) NASAL DAILY
Qty: 16 G | Refills: 1 | Status: SHIPPED | OUTPATIENT
Start: 2023-10-13

## 2023-10-13 RX ORDER — ACETAMINOPHEN 500 MG
TABLET ORAL
COMMUNITY

## 2023-10-13 ASSESSMENT — FIBROSIS 4 INDEX: FIB4 SCORE: 0.42

## 2023-10-13 NOTE — ASSESSMENT & PLAN NOTE
Bilateral cerumen impaction.  Patient cannot tolerate irrigation due to it causing dizziness.  Advised to do Debrox daily return to care in a few weeks to try again.  Advised to not use Q-tips.

## 2023-10-13 NOTE — ASSESSMENT & PLAN NOTE
BMI increasing though may be due to muscle mass.  Continue resistance exercise, cardiovascular exercise.  Diet counseling given.

## 2023-10-13 NOTE — PROGRESS NOTES
HISTORY OF PRESENT ILLNESS: Anurag is a pleasant 23 y.o. male, established patient who presents today to discuss medical problems as listed below:    Problem   Bilateral Impacted Cerumen   Obesity Due to Excess Calories With Serious Comorbidity   Morbid Obesity With Bmi of 40.0-44.9, Adult ([facility])    Reports he has gained weight since last time however he is exercising a lot more and weight lifting which he thinks may be contributing as he has gained some muscle.  Reports that he feels better, he is less short of breath and has more endurance.  He is starting to eat at home and bring his meals to work     Seasonal Allergic Rhinitis Due to Pollen   Shortness of Breath   Nummular Eczematous Dermatitis    Reports well-controlled with hydrocortisone 2.5% cream     Chest Pain At Rest (Resolved)    Patient still reports to chest pain that occurs at rest.  Denies any exertional component to this.  He went to the ER a few weeks ago and was told that he had a hiatal hernia that may be the cause of the symptoms.  He was given a PPI which does help the symptoms somewhat but they still persist.  Troponin negative.          Current Outpatient Medications Ordered in Epic   Medication Sig Dispense Refill    acetaminophen (TYLENOL) 500 MG Tab Take  by mouth.      cetirizine (ZYRTEC) 10 MG Tab Take 1 Tablet by mouth every day. 90 Tablet 1    fluticasone (FLONASE) 50 MCG/ACT nasal spray Administer 1 Spray into affected nostril(S) every day. 16 g 1    hydrocortisone 2.5 % Cream topical cream Apply 1 Application. topically 2 times a day. 453 g 1    cyclobenzaprine (FLEXERIL) 10 mg Tab Take 1 Tablet by mouth 2 times a day as needed for Moderate Pain. 60 Tablet 1     No current Epic-ordered facility-administered medications on file.       Review of systems:  Per HPI    Patient Active Problem List    Diagnosis Date Noted    Bilateral impacted cerumen 10/13/2023    Obesity due to excess calories with serious comorbidity 10/13/2023    Morbid  "obesity with BMI of 40.0-44.9, adult ([facility]) 10/13/2023    Seasonal allergic rhinitis due to pollen 10/13/2023    Shortness of breath 2023    Nummular eczematous dermatitis 11/10/2022    Family history of heart disease 10/06/2022     Past Surgical History:   Procedure Laterality Date    ORIF, FRACTURE, RADIUS AND ULNA  2013    Performed by Michelet Mendoza D.O. at SURGERY [facility]     Social History     Tobacco Use    Smoking status: Former     Current packs/day: 0.00     Average packs/day: 1 pack/day for 5.0 years (5.0 ttl pk-yrs)     Types: Cigarettes     Start date:      Quit date:      Years since quittin.7    Smokeless tobacco: Never   Vaping Use    Vaping Use: Every day    Substances: Nicotine (6 mg)    Devices: [employer]   Substance Use Topics    Alcohol use: Yes     Comment: rarely    Drug use: No      Family History   Problem Relation Age of Onset    Heart Disease Mother         MI at 41    Thyroid Mother     Thyroid Father      Current Outpatient Medications   Medication Sig Dispense Refill    acetaminophen (TYLENOL) 500 MG Tab Take  by mouth.      cetirizine (ZYRTEC) 10 MG Tab Take 1 Tablet by mouth every day. 90 Tablet 1    fluticasone (FLONASE) 50 MCG/ACT nasal spray Administer 1 Spray into affected nostril(S) every day. 16 g 1    hydrocortisone 2.5 % Cream topical cream Apply 1 Application. topically 2 times a day. 453 g 1    cyclobenzaprine (FLEXERIL) 10 mg Tab Take 1 Tablet by mouth 2 times a day as needed for Moderate Pain. 60 Tablet 1     No current facility-administered medications for this visit.       Allergies:  No Known Allergies    Allergies, past medical history, past surgical history, family history, social history reviewed and updated.    Objective:    /78 (BP Location: Left arm, Patient Position: Sitting, BP Cuff Size: Large adult)   Pulse 83   Temp 36.5 °C (97.7 °F) (Temporal)   Resp 16   Ht 1.905 m (6' 3\")   Wt (!) 152 kg (335 lb 5.1 oz)  "  SpO2 94%   BMI 41.91 kg/m²    Body mass index is 41.91 kg/m².    Physical exam:  General: Normal appearance, no acute distress, not ill-appearing  HEENT: EOM intact, conjunctiva normal limits, negative right/left eye discharge.  Sclera anicteric, Derman impaction bilaterally  Cardiovascular: Normal rate and rhythm, no murmurs  Pulmonary: No respiratory distress, no wheezing, no rales, breath sounds normal.  Musculoskeletal: No edema bilaterally  Skin: Warm, dry, no lesions  Neurological: No focal deficits, normal gait  Psychiatric: Mood within normal limits    Assessment/Plan:    Problem List Items Addressed This Visit       Nummular eczematous dermatitis     Chronic, stable.  Continue hydrocortisone as needed         Bilateral impacted cerumen     Bilateral cerumen impaction.  Patient cannot tolerate irrigation due to it causing dizziness.  Advised to do Debrox daily return to care in a few weeks to try again.  Advised to not use Q-tips.         Obesity due to excess calories with serious comorbidity    Morbid obesity with BMI of 40.0-44.9, adult ([facility])     BMI increasing though may be due to muscle mass.  Continue resistance exercise, cardiovascular exercise.  Diet counseling given.         Seasonal allergic rhinitis due to pollen     Seasonal allergies.  He is using Mucinex with no benefit.  Advised to DC this, start Flonase, Zyrtec.         Relevant Medications    cetirizine (ZYRTEC) 10 MG Tab    fluticasone (FLONASE) 50 MCG/ACT nasal spray        Return in about 4 weeks (around 11/10/2023), or if symptoms worsen or fail to improve.

## 2023-10-13 NOTE — ASSESSMENT & PLAN NOTE
Seasonal allergies.  He is using Mucinex with no benefit.  Advised to DC this, start Flonase, Zyrtec.

## 2025-01-17 ENCOUNTER — APPOINTMENT (OUTPATIENT)
Dept: MEDICAL GROUP | Facility: PHYSICIAN GROUP | Age: 25
End: 2025-01-17
Payer: COMMERCIAL